# Patient Record
Sex: MALE | Race: WHITE | URBAN - METROPOLITAN AREA
[De-identification: names, ages, dates, MRNs, and addresses within clinical notes are randomized per-mention and may not be internally consistent; named-entity substitution may affect disease eponyms.]

---

## 2023-03-22 ENCOUNTER — OFFICE VISIT (OUTPATIENT)
Dept: URBAN - METROPOLITAN AREA CLINIC 71 | Facility: CLINIC | Age: 68
End: 2023-03-22
Payer: COMMERCIAL

## 2023-03-22 DIAGNOSIS — H35.3133 NONEXUDATIVE AGE-RELATED MACULAR DEGENERATION, BILATERAL, ADVANCED ATROPHIC WITHOUT SUBFOVEAL INVOLVEMENT: ICD-10-CM

## 2023-03-22 DIAGNOSIS — H52.223 REGULAR ASTIGMATISM, BILATERAL: ICD-10-CM

## 2023-03-22 DIAGNOSIS — H17.89 OTHER CORNEAL SCARS AND OPACITIES: ICD-10-CM

## 2023-03-22 DIAGNOSIS — H25.13 NUCLEAR SCLEROSIS CATARACT, BILATERAL: Primary | ICD-10-CM

## 2023-03-22 PROCEDURE — 92004 COMPRE OPH EXAM NEW PT 1/>: CPT | Performed by: OPTOMETRIST

## 2023-03-22 PROCEDURE — 92134 CPTRZ OPH DX IMG PST SGM RTA: CPT | Performed by: OPTOMETRIST

## 2023-03-22 ASSESSMENT — KERATOMETRY
OD: 45.63
OS: 46.13

## 2023-03-22 ASSESSMENT — VISUAL ACUITY
OD: 20/40
OS: 20/150

## 2023-03-22 ASSESSMENT — INTRAOCULAR PRESSURE
OS: 8
OD: 10

## 2023-03-22 NOTE — IMPRESSION/PLAN
Impression: Other corneal scars and opacities: H17.89. Bilateral. Possibly due to pt being a  for many years. Plan: Continue to observe.

## 2023-03-22 NOTE — IMPRESSION/PLAN
Impression: Nonexudative age-related macular degeneration, bilateral, advanced atrophic without subfoveal involvement: H35.3133. OCT mac 03/22/23: Plan: OCT mac ordered and performed today. Discussed in detail with pt. Refer to retina specialist next available for further evaluation and to rule out vascular etiology.

## 2023-03-22 NOTE — IMPRESSION/PLAN
Impression: Regular astigmatism, bilateral: H52.223. Plan: A glasses prescription has been discussed and generated. Pt understands that glasses should help his vision some, but not expected to make a significant improvement in vision OS>OD due to other underlying conditions. Adaptation period expected. Patient to call with any concerns.

## 2023-04-28 ENCOUNTER — OFFICE VISIT (OUTPATIENT)
Dept: URBAN - METROPOLITAN AREA CLINIC 71 | Facility: CLINIC | Age: 68
End: 2023-04-28
Payer: COMMERCIAL

## 2023-04-28 DIAGNOSIS — H25.13 AGE-RELATED NUCLEAR CATARACT, BILATERAL: ICD-10-CM

## 2023-04-28 DIAGNOSIS — H35.3133 NONEXUDATIVE AGE-RELATED MACULAR DEGENERATION, BILATERAL, ADVANCED ATROPHIC WITHOUT SUBFOVEAL INVOLVEMENT: Primary | ICD-10-CM

## 2023-04-28 PROCEDURE — 92250 FUNDUS PHOTOGRAPHY W/I&R: CPT | Performed by: OPHTHALMOLOGY

## 2023-04-28 PROCEDURE — 92134 CPTRZ OPH DX IMG PST SGM RTA: CPT | Performed by: OPHTHALMOLOGY

## 2023-04-28 PROCEDURE — 99204 OFFICE O/P NEW MOD 45 MIN: CPT | Performed by: OPHTHALMOLOGY

## 2023-04-28 ASSESSMENT — INTRAOCULAR PRESSURE
OS: 10
OD: 10

## 2023-04-28 NOTE — IMPRESSION/PLAN
Impression: Age-related nuclear cataract, bilateral: H25.13. Plan: Agree that there is limited benefit to cataract surgery at this time 2ry to macular disease.

## 2023-05-03 ENCOUNTER — OFFICE VISIT (OUTPATIENT)
Dept: URBAN - METROPOLITAN AREA CLINIC 71 | Facility: CLINIC | Age: 68
End: 2023-05-03
Payer: COMMERCIAL

## 2023-05-03 DIAGNOSIS — H53.2 DIPLOPIA: Primary | ICD-10-CM

## 2023-05-03 DIAGNOSIS — H52.223 REGULAR ASTIGMATISM, BILATERAL: ICD-10-CM

## 2023-05-03 PROCEDURE — 99213 OFFICE O/P EST LOW 20 MIN: CPT | Performed by: OPTOMETRIST

## 2023-05-03 ASSESSMENT — INTRAOCULAR PRESSURE
OS: 8
OD: 7

## 2023-05-03 NOTE — IMPRESSION/PLAN
Impression: Regular astigmatism, bilateral: H52.223. EDS OD is plano. VA OD with and without glasses was 20/40. Vision OS much more limited by AMD. Plan: A new glasses prescription has NOT been generated or given to pt. Pt understands that glasses may help his vision some, but not expected to make a significant improvement in vision OS>OD due to other underlying conditions, like Macular Degeneration. Will give pt information to get a hold of [facility] to see if they have any low vision aid available. Patient to call with any concerns.

## 2023-05-03 NOTE — IMPRESSION/PLAN
Impression: Diplopia: H53.2. Pt reports double vision for about a year (never mentioned before) but more bothersome with glasses, noticed at distance. When asked to describe, he continued to complain about blur, not diplopia. 4 BI to fusion? but then when OD covered, pt reports OS image far inferior and to right. Plan: Discussed in detail with pt. Prism evaluation performed in office today. Based on evaluation, offered to add 4 BI prism to glasses but adding prism to glasses Rx will not likely help his double vision. Would likely need much more prism but may never fuse due to poor vision OS. Majority of pt complaints are related to decreased vision related to his Macular Degeneration OS>OD, which cannot be fixed by glasses. Gave option of keeping OS blurry so that the diplopia is not as noticeable, but OD not significantly helped by glasses anyway.

## 2023-05-26 ENCOUNTER — OFFICE VISIT (OUTPATIENT)
Dept: URBAN - METROPOLITAN AREA CLINIC 71 | Facility: CLINIC | Age: 68
End: 2023-05-26
Payer: COMMERCIAL

## 2023-05-26 DIAGNOSIS — H35.52 PIGMENTARY RETINAL DYSTROPHY: Primary | ICD-10-CM

## 2023-05-26 PROCEDURE — 92250 FUNDUS PHOTOGRAPHY W/I&R: CPT | Performed by: OPHTHALMOLOGY

## 2023-05-26 PROCEDURE — 92134 CPTRZ OPH DX IMG PST SGM RTA: CPT | Performed by: OPHTHALMOLOGY

## 2023-05-26 PROCEDURE — 99214 OFFICE O/P EST MOD 30 MIN: CPT | Performed by: OPHTHALMOLOGY

## 2023-05-26 ASSESSMENT — INTRAOCULAR PRESSURE
OS: 14
OD: 10

## 2023-05-26 NOTE — IMPRESSION/PLAN
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 Plan: FAF imaging consistent w/ cone-tammy dystrophy - no benefit to [address] Observe, RTC 6 months, check for progressive hyper-AF at junctional zone that may benefit to off-label use of anti-COMPLEMENT factor tx. RTC 6 months DFEx/OCT mac/FP/FAF.

## 2024-03-07 ENCOUNTER — OFFICE VISIT (OUTPATIENT)
Dept: URBAN - METROPOLITAN AREA CLINIC 71 | Facility: CLINIC | Age: 69
End: 2024-03-07
Payer: COMMERCIAL

## 2024-03-07 DIAGNOSIS — H35.52 PIGMENTARY RETINAL DYSTROPHY: Primary | ICD-10-CM

## 2024-03-07 PROCEDURE — 99214 OFFICE O/P EST MOD 30 MIN: CPT | Performed by: OPHTHALMOLOGY

## 2024-03-07 PROCEDURE — 92134 CPTRZ OPH DX IMG PST SGM RTA: CPT | Performed by: OPHTHALMOLOGY

## 2024-03-07 PROCEDURE — 92250 FUNDUS PHOTOGRAPHY W/I&R: CPT | Performed by: OPHTHALMOLOGY

## 2024-03-07 ASSESSMENT — INTRAOCULAR PRESSURE
OS: 9
OD: 9

## 2024-10-31 ENCOUNTER — OFFICE VISIT (OUTPATIENT)
Dept: URBAN - METROPOLITAN AREA CLINIC 71 | Facility: CLINIC | Age: 69
End: 2024-10-31
Payer: COMMERCIAL

## 2024-10-31 DIAGNOSIS — H25.13 AGE-RELATED NUCLEAR CATARACT, BILATERAL: ICD-10-CM

## 2024-10-31 DIAGNOSIS — H35.3113 NEXDTVE AGE-REL MCLR DEGN, R EYE, ADV ATRPC W/O SBFVL INVL: Primary | ICD-10-CM

## 2024-10-31 DIAGNOSIS — H35.3124 NEXDTVE AGE-REL MCLR DEGN, L EYE, ADV ATRPC WITH SBFVL INVL: ICD-10-CM

## 2024-10-31 PROCEDURE — 92014 COMPRE OPH EXAM EST PT 1/>: CPT | Performed by: OPHTHALMOLOGY

## 2024-10-31 PROCEDURE — 92134 CPTRZ OPH DX IMG PST SGM RTA: CPT | Performed by: OPHTHALMOLOGY

## 2024-10-31 RX ORDER — CARVEDILOL 25 MG/1
25 MG TABLET, FILM COATED ORAL
Qty: 0 | Refills: 0 | Status: ACTIVE
Start: 2024-10-31

## 2024-10-31 ASSESSMENT — INTRAOCULAR PRESSURE
OD: 9
OS: 10